# Patient Record
Sex: MALE | Race: OTHER | Employment: STUDENT | URBAN - NONMETROPOLITAN AREA
[De-identification: names, ages, dates, MRNs, and addresses within clinical notes are randomized per-mention and may not be internally consistent; named-entity substitution may affect disease eponyms.]

---

## 2018-04-15 ENCOUNTER — NURSE TRIAGE (OUTPATIENT)
Dept: ADMINISTRATIVE | Age: 23
End: 2018-04-15

## 2021-08-11 ENCOUNTER — TELEPHONE (OUTPATIENT)
Dept: ORTHOPEDIC SURGERY | Age: 26
End: 2021-08-11

## 2021-08-11 NOTE — TELEPHONE ENCOUNTER
A Referral was sent to the Orthopedic Specialist Department from [facility].  After three attempts to contact the patient to schedule an appointment, the patient has not returned messages to schedule an appointment.